# Patient Record
Sex: MALE | Race: BLACK OR AFRICAN AMERICAN | Employment: UNEMPLOYED | URBAN - METROPOLITAN AREA
[De-identification: names, ages, dates, MRNs, and addresses within clinical notes are randomized per-mention and may not be internally consistent; named-entity substitution may affect disease eponyms.]

---

## 2020-12-02 ENCOUNTER — HOSPITAL ENCOUNTER (EMERGENCY)
Age: 65
Discharge: HOME | End: 2020-12-02
Attending: EMERGENCY MEDICINE
Payer: COMMERCIAL

## 2020-12-02 VITALS
HEART RATE: 60 BPM | SYSTOLIC BLOOD PRESSURE: 132 MMHG | OXYGEN SATURATION: 99 % | DIASTOLIC BLOOD PRESSURE: 77 MMHG | TEMPERATURE: 98.3 F | RESPIRATION RATE: 20 BRPM

## 2020-12-02 PROCEDURE — 99284 EMERGENCY DEPT VISIT MOD MDM: CPT

## 2020-12-02 NOTE — ED NOTES
Pt to ED via EMS with [employer] at bedside. Per Ems pt was arrested at [address] for shop lifting. Pt then told [employer] that he swallowed 3 grams of cocaine. Pt also admits to snorting cocaine today. Pt denies any other drug use pt denies any pain at this time.  Pt placed on monitor, call light in reach   Will continue to monitor      Steve Watson RN  12/02/20 0731

## 2020-12-02 NOTE — ED NOTES
Bed: 17  Expected date:   Expected time:   Means of arrival:   Comments:  [address], RN  12/02/20 6423

## 2020-12-03 NOTE — ED PROVIDER NOTES
[facility]  ED  Emergency Department Encounter  EmergencyMedicine Resident     Pt Name:Malcom Painter  MRN: [id number]  [place] 1955  Date of evaluation: 12/2/20  PCP:  No primary care provider on file. CHIEF COMPLAINT       Chief Complaint   Patient presents with    Ingestion       HISTORY OF PRESENT ILLNESS  (Location/Symptom, Timing/Onset, Context/Setting, Quality, Duration, Modifying Factors, Severity.)      Isabel Herman is a 77 y.o. male who presents with ingestion of cocaine. Approximately 1 hour prior to arrival to the ED, patient swallowed 2-3 baggies of cocaine. Patient was noted by police and EMS at that time had no changes in vitals and is consistent with vitals currently in the ER upon presentation. This time patient states that he has no complaints including chest pain, shortness breath, agitation, anxiety, diaphoresis, abdominal pain, nausea/vomiting, diarrhea, numbness/tingling or weakness. Patient does report though that he does have stage IV cancer support and receiving chemotherapy and Metformin today. Patient has no other complaints at this time and is cooperative with being evaluated. PAST MEDICAL / SURGICAL / SOCIAL / FAMILY HISTORY     Patient is a bright medical history of stage IV cancer and diabetes     has a past surgical history that includes back surgery. Social History     Socioeconomic History    Marital status: Single     Spouse name: Not on file    Number of children: Not on file    Years of education: Not on file    Highest education level: Not on file   Occupational History    Not on file   Social Needs    Financial resource strain: Not on file    Food insecurity     Worry: Not on file     Inability: Not on file    Transportation needs     Medical: Not on file     Non-medical: Not on file   Tobacco Use    Smoking status: Never Smoker   Substance and Sexual Activity    Alcohol use:  Yes    Drug use: Yes     Types: Cocaine, Marijuana  Sexual activity: Not on file   Lifestyle    Physical activity     Days per week: Not on file     Minutes per session: Not on file    Stress: Not on file   Relationships    Social connections     Talks on phone: Not on file     Gets together: Not on file     Attends [employer] service: Not on file     Active member of club or organization: Not on file     Attends meetings of clubs or organizations: Not on file     Relationship status: Not on file    Intimate partner violence     Fear of current or ex partner: Not on file     Emotionally abused: Not on file     Physically abused: Not on file     Forced sexual activity: Not on file   Other Topics Concern    Not on file   Social History Narrative    Not on file       No family history on file. Allergies:  Patient has no known allergies. Home Medications:  Prior to Admission medications    Medication Sig Start Date End Date Taking? Authorizing Provider   oxyCODONE-acetaminophen (PERCOCET) 5-325 MG per tablet Take 1 tablet by mouth every 8 hours as needed for Pain 9/4/16   Jose Elias Dinh, DO   docusate sodium (COLACE) 100 MG capsule Take 1 capsule by mouth daily 9/4/16   Jose Elias Dinh, DO   tamsulosin [facility]) 0.4 MG capsule Take 1 capsule by mouth daily for 5 doses 9/4/16 9/9/16  Jose Elias Dinh, DO       REVIEW OF SYSTEMS    (2-9 systems for level 4, 10 or more for level 5)      Review of Systems   Constitutional: Negative for chills, fatigue and fever. HENT: Negative for congestion and sore throat. Eyes: Negative for photophobia and visual disturbance. Respiratory: Negative for cough and shortness of breath. Cardiovascular: Negative for chest pain and palpitations. Gastrointestinal: Negative for abdominal pain, nausea and vomiting. Genitourinary: Negative for dysuria and hematuria. Musculoskeletal: Negative for arthralgias and myalgias. Skin: Negative for rash and wound. Neurological: Negative for weakness and numbness. PHYSICAL EXAM   (up to 7 for level 4, 8 or more for level 5)      INITIAL VITALS:   /77   Pulse 60   Temp 98.3 °F (36.8 °C)   Resp 20   SpO2 99%     Physical Exam  Vitals signs and nursing note reviewed. Constitutional:       General: He is not in acute distress. Appearance: Normal appearance. He is well-developed and normal weight. He is not toxic-appearing or diaphoretic. HENT:      Head: Normocephalic and atraumatic. Right Ear: External ear normal.      Left Ear: External ear normal.      Nose: Nose normal.      Mouth/Throat:      Mouth: Mucous membranes are moist.   Eyes:      General: No scleral icterus. Extraocular Movements: Extraocular movements intact. Conjunctiva/sclera: Conjunctivae normal.   Neck:      Musculoskeletal: Normal range of motion and neck supple. No neck rigidity. Vascular: No JVD. Trachea: No tracheal deviation. Cardiovascular:      Rate and Rhythm: Normal rate and regular rhythm. Heart sounds: Normal heart sounds, S1 normal and S2 normal. No murmur. No friction rub. No gallop. Pulmonary:      Effort: Pulmonary effort is normal. No respiratory distress. Breath sounds: Normal breath sounds. Abdominal:      General: Abdomen is flat. There is no distension. Palpations: Abdomen is soft. Tenderness: There is no abdominal tenderness. There is no guarding or rebound. Musculoskeletal: Normal range of motion. General: No swelling or tenderness. Comments: Port in the right subclavian that is nontender palpation with no overlying erythema, warmth, purulent discharge. Skin:     General: Skin is warm and dry. Capillary Refill: Capillary refill takes less than 2 seconds. Neurological:      Mental Status: He is alert and oriented to person, place, and time. Motor: No abnormal muscle tone.          DIFFERENTIAL  DIAGNOSIS     PLAN (LABS / IMAGING / EKG):  Orders Placed This Encounter   Procedures    COMPREHENSIVE METABOLIC PANEL    CBC WITH AUTO DIFFERENTIAL    TOX SCR, BLD, ED    EKG 12 Lead       MEDICATIONS ORDERED:  No orders of the defined types were placed in this encounter. DDX: Cocaine ingestion    DIAGNOSTIC RESULTS / EMERGENCY DEPARTMENT COURSE / MDM   LAB RESULTS:  No results found for this visit on 12/02/20. IMPRESSION: 75-year-old male presents for cocaine ingestion. Patient appears to be in no acute distress and nontoxic-appearing. Patient's as well as are stable nonconcerning. Clear lung sounds bilaterally. RRR with normal S1-S2 heart sounds with no murmurs rubs or gallops. Abdomen soft nontender with no guarding/rigidity/rebound tenderness. Cap refill less than 2 seconds. No signs of cocaine use including diaphoresis, mieosis, anxiety, agitation, or restlessness. Concern for cocaine ingestion. We will continue to monitor the patient in order CBC, CMP, tach screen, and EKG. RADIOLOGY:  none    EKG  None    EMERGENCY DEPARTMENT COURSE:  While waiting for lab work and EKG performed, [employer] had left the emergency department. Patient then eloped before any testing could be done or patient could be made aware of any consequences such as worsening condition, chest pain, anxiety, coma, and possible death due to him leaving early. PROCEDURES:  none    CONSULTS:  None    CRITICAL CARE:  Please see attending note    FINAL IMPRESSION      1. Eloped from emergency department    2. Ingestion of substance, undetermined intent, initial encounter          DISPOSITION / [address] - Left Before Treatment Complete 12/02/2020 06:08:38 PM      PATIENT REFERRED TO:  No follow-up provider specified.     DISCHARGE MEDICATIONS:  Discharge Medication List as of 12/2/2020  6:23 PM          Yesi Mujica DO  Emergency Medicine Resident    (Please note that portions of thisnote were completed with a voice recognition program.  Efforts were made to edit the dictations but occasionally words are mis-transcribed.)       Crystal Clements DO  Resident  12/06/20 9103

## 2020-12-06 ASSESSMENT — ENCOUNTER SYMPTOMS
SORE THROAT: 0
SHORTNESS OF BREATH: 0
COUGH: 0
NAUSEA: 0
PHOTOPHOBIA: 0
ABDOMINAL PAIN: 0
VOMITING: 0

## 2024-11-08 ENCOUNTER — HOSPITAL ENCOUNTER (OUTPATIENT)
Age: 69
Setting detail: SPECIMEN
Discharge: HOME | End: 2024-11-08

## 2024-11-08 LAB
ALBUMIN SERPL-MCNC: 3.7 G/DL (ref 3.5–5.2)
ALBUMIN/GLOB SERPL: 1.3 {RATIO} (ref 1–2.5)
ALP SERPL-CCNC: 154 U/L (ref 40–129)
ALT SERPL-CCNC: 12 U/L (ref 10–50)
ANION GAP SERPL CALCULATED.3IONS-SCNC: 10 MMOL/L (ref 9–16)
AST SERPL-CCNC: 13 U/L (ref 10–50)
BILIRUB SERPL-MCNC: 0.3 MG/DL (ref 0–1.2)
BUN SERPL-MCNC: 15 MG/DL (ref 8–23)
CALCIUM SERPL-MCNC: 9.7 MG/DL (ref 8.8–10.2)
CHLORIDE SERPL-SCNC: 102 MMOL/L (ref 98–107)
CHOLEST SERPL-MCNC: 134 MG/DL (ref 0–199)
CHOLESTEROL/HDL RATIO: 2.8
CO2 SERPL-SCNC: 27 MMOL/L (ref 20–31)
CREAT SERPL-MCNC: 0.8 MG/DL (ref 0.7–1.2)
ERYTHROCYTE [DISTWIDTH] IN BLOOD BY AUTOMATED COUNT: 12.3 % (ref 11.8–14.4)
EST. AVERAGE GLUCOSE BLD GHB EST-MCNC: 140 MG/DL
FOLATE SERPL-MCNC: 24.1 NG/ML (ref 4.8–24.2)
GFR, ESTIMATED: >90 ML/MIN/1.73M2
GLUCOSE SERPL-MCNC: 140 MG/DL (ref 82–115)
HBA1C MFR BLD: 6.5 % (ref 4–6)
HCT VFR BLD AUTO: 35 % (ref 40.7–50.3)
HDLC SERPL-MCNC: 48 MG/DL
HGB BLD-MCNC: 11.3 G/DL (ref 13–17)
LDLC SERPL CALC-MCNC: 74 MG/DL (ref 0–100)
MCH RBC QN AUTO: 36.2 PG (ref 25.2–33.5)
MCHC RBC AUTO-ENTMCNC: 32.3 G/DL (ref 28.4–34.8)
MCV RBC AUTO: 112.2 FL (ref 82.6–102.9)
NRBC BLD-RTO: 0 PER 100 WBC
PLATELET # BLD AUTO: 233 K/UL (ref 138–453)
PMV BLD AUTO: 9.9 FL (ref 8.1–13.5)
POTASSIUM SERPL-SCNC: 4.9 MMOL/L (ref 3.7–5.3)
PROT SERPL-MCNC: 6.5 G/DL (ref 6.6–8.7)
RBC # BLD AUTO: 3.12 M/UL (ref 4.21–5.77)
SODIUM SERPL-SCNC: 139 MMOL/L (ref 136–145)
TRIGL SERPL-MCNC: 62 MG/DL (ref 0–149)
VIT B12 SERPL-MCNC: 599 PG/ML (ref 232–1245)
VLDLC SERPL CALC-MCNC: 12 MG/DL (ref 1–30)
WBC OTHER # BLD: 4.9 K/UL (ref 3.5–11.3)

## 2024-11-08 PROCEDURE — 85027 COMPLETE CBC AUTOMATED: CPT

## 2024-11-08 PROCEDURE — 83036 HEMOGLOBIN GLYCOSYLATED A1C: CPT

## 2024-11-08 PROCEDURE — 80053 COMPREHEN METABOLIC PANEL: CPT

## 2024-11-08 PROCEDURE — P9603 ONE-WAY ALLOW PRORATED MILES: HCPCS

## 2024-11-08 PROCEDURE — 82607 VITAMIN B-12: CPT

## 2024-11-08 PROCEDURE — 36415 COLL VENOUS BLD VENIPUNCTURE: CPT

## 2024-11-08 PROCEDURE — 82746 ASSAY OF FOLIC ACID SERUM: CPT

## 2024-11-08 PROCEDURE — 80061 LIPID PANEL: CPT
